# Patient Record
Sex: FEMALE | Race: WHITE | NOT HISPANIC OR LATINO | URBAN - METROPOLITAN AREA
[De-identification: names, ages, dates, MRNs, and addresses within clinical notes are randomized per-mention and may not be internally consistent; named-entity substitution may affect disease eponyms.]

---

## 2024-03-11 ENCOUNTER — TELEPHONE (OUTPATIENT)
Dept: OBSTETRICS AND GYNECOLOGY | Facility: CLINIC | Age: 29
End: 2024-03-11

## 2024-03-11 DIAGNOSIS — Z30.41 ENCOUNTER FOR SURVEILLANCE OF CONTRACEPTIVE PILLS: ICD-10-CM

## 2024-03-11 RX ORDER — NORETHINDRONE ACETATE AND ETHINYL ESTRADIOL 1MG-20(21)
1 KIT ORAL DAILY
Qty: 28 TABLET | Refills: 6 | Status: SHIPPED | OUTPATIENT
Start: 2024-03-11

## 2024-03-11 RX ORDER — NORETHINDRONE ACETATE AND ETHINYL ESTRADIOL 1MG-20(21)
1 KIT ORAL DAILY
COMMUNITY
End: 2024-03-11 | Stop reason: SDUPTHER

## 2024-03-11 NOTE — TELEPHONE ENCOUNTER
----- Message from Pita Pino sent at 3/11/2024 11:43 AM EDT -----  Regarding: Rx Refill Request  Patient calling for a refill request:  Junel FE    Rx will need TOMMIE, patient can only tolerate Junel Fe.     Local pharmacy:  [facility] [facility]                               [phone]    Note:  Patient not due for her Annual Exam with Kimberly until after 09-05-24.     Thank you

## 2024-03-26 ENCOUNTER — TELEPHONE (OUTPATIENT)
Dept: OBSTETRICS AND GYNECOLOGY | Facility: CLINIC | Age: 29
End: 2024-03-26

## 2024-10-01 ENCOUNTER — APPOINTMENT (OUTPATIENT)
Dept: OBSTETRICS AND GYNECOLOGY | Facility: CLINIC | Age: 29
End: 2024-10-01

## 2024-10-01 VITALS
SYSTOLIC BLOOD PRESSURE: 149 MMHG | DIASTOLIC BLOOD PRESSURE: 94 MMHG | HEIGHT: 65 IN | BODY MASS INDEX: 26.23 KG/M2 | WEIGHT: 157.4 LBS

## 2024-10-01 DIAGNOSIS — R03.0 ELEVATED BP WITHOUT DIAGNOSIS OF HYPERTENSION: ICD-10-CM

## 2024-10-01 DIAGNOSIS — Z30.41 ENCOUNTER FOR SURVEILLANCE OF CONTRACEPTIVE PILLS: ICD-10-CM

## 2024-10-01 DIAGNOSIS — Z01.419 WELL WOMAN EXAM WITH ROUTINE GYNECOLOGICAL EXAM: Primary | ICD-10-CM

## 2024-10-01 PROCEDURE — 3008F BODY MASS INDEX DOCD: CPT | Performed by: NURSE PRACTITIONER

## 2024-10-01 PROCEDURE — 99395 PREV VISIT EST AGE 18-39: CPT | Performed by: NURSE PRACTITIONER

## 2024-10-01 PROCEDURE — 1036F TOBACCO NON-USER: CPT | Performed by: NURSE PRACTITIONER

## 2024-10-01 RX ORDER — NORETHINDRONE ACETATE AND ETHINYL ESTRADIOL AND FERROUS FUMARATE 1MG-20(21)
1 KIT ORAL DAILY
Qty: 84 TABLET | Refills: 3 | Status: SHIPPED | OUTPATIENT
Start: 2024-10-01 | End: 2025-10-01

## 2024-10-01 ASSESSMENT — PAIN SCALES - GENERAL: PAINLEVEL: 0-NO PAIN

## 2024-10-01 NOTE — PROGRESS NOTES
HPI: Franchesca Funes is a 29 y.o. year old  presenting for annual gyn exam  Pt still on OCPs  Mother had breast ca, age 37. Did not have genetic testing, but will ask this year. Discussed with pt will start early screen in 30s, next year.  Single, working and traveling often. Will be going to [place] this month.    Current concerns: none    Cycles are 28 days apart, lasting for 8-9 days now with new generic medication, has been somewhat bothersome off of Junel, would like to go back.   LMP:  Patient's last menstrual period was 2024 (approximate).  Sexually active: no  STI concerns: no  Contraception: OCP   Last pap:  normal  History of abnormal pap: no  Other gyn history: none   OB History    No obstetric history on file.      No past medical history on file.   No past surgical history on file.   Social History    Socioeconomic History      Marital status: Single      Spouse name: Not on file      Number of children: Not on file      Years of education: Not on file      Highest education level: Not on file    Occupational History      Not on file    Tobacco Use      Smoking status: Never      Smokeless tobacco: Never    Substance and Sexual Activity      Alcohol use: Not on file      Drug use: Not on file      Sexual activity: Not on file    Other Topics      Concerns:        Not on file    Social History Narrative      Not on file    Social Determinants of Health  Financial Resource Strain: Not on file  Food Insecurity: Not on file  Transportation Needs: Not on file  Physical Activity: Not on file  Stress: Not on file  Social Connections: Not on file  Intimate Partner Violence: Not on file  Housing Stability: Not on file   No family history on file.     Current Outpatient Medications:   norethindrone-e.estradioL-iron (Junel FE 1/20) 1 mg-20 mcg (21)/75 mg (7) tablet, Take 1 tablet by mouth once daily., Disp: 28 tablet, Rfl: 6          REVIEW OF SYSTEMS:  Breast. denies masses, nipple discharge  : denies  "dysuria, hematuria incontinence   Gl: denies change in bowel habits, blood in stools      PHYSICAL EXAM:  Vitals: BP (!) 149/94 (BP Location: Left arm, Patient Position: Sitting, BP Cuff Size: Adult) Comment: Extended Vitals Done  Ht 1.651 m (5' 5\")   Wt 71.4 kg (157 lb 6.4 oz)   LMP 09/06/2024 (Approximate)   BMI 26.19 kg/m²   Gen: well appearing woman in NAD  HEENT: normocephalic, thyroid not enlarged, no lymphadenopathy  CV: no m/r/g  Chest: CTAB, no w/r/r  Breast: normal tissue bilaterally without masses, skin changes, lymphadenopathy   Abdomen: soft, nontender, no masses/hernias, liver and spleen not enlarged   Pelvic:  - external genitalia: normal  - vagina: normal  - cervix: normal  - uterus: normal size, nontender  - adnexa: no palpable masses or tenderness  RECTAL: no external hemorrhoids; rectal exam deferred    ASSESSMENT/PLAN :    1) Health maintenance, Well-woman exam.  Pap/HPV up to date  Nutrition, exercise and routine health maintenance exams reviewed.  Calcium/Vitamin D supplementation information provided   Smoking cessation: non-smoker  2) Contraception: OCPs, disliking the Aurovela generic, try TOMMIE Junel.   Discussed DVT and cardiovascular risk with OCP use. Patient is aware of this risk and desires to continue current oral contraceptive.   Pt instructed to monitor BP as it has been elevated this visit. Pt states sometimes in office it can be  Referral given for PCP  3) STD screening: declines, no concerns, not SA  4) Follow up one year or sooner as needed   "

## 2024-11-08 ENCOUNTER — APPOINTMENT (OUTPATIENT)
Dept: PRIMARY CARE | Facility: CLINIC | Age: 29
End: 2024-11-08
Payer: COMMERCIAL

## 2024-11-08 VITALS
BODY MASS INDEX: 26.29 KG/M2 | WEIGHT: 158 LBS | RESPIRATION RATE: 17 BRPM | SYSTOLIC BLOOD PRESSURE: 138 MMHG | TEMPERATURE: 98.1 F | HEART RATE: 83 BPM | OXYGEN SATURATION: 98 % | DIASTOLIC BLOOD PRESSURE: 93 MMHG

## 2024-11-08 DIAGNOSIS — Z83.3 FH: DIABETES MELLITUS: Primary | ICD-10-CM

## 2024-11-08 DIAGNOSIS — R03.0 ELEVATED BP WITHOUT DIAGNOSIS OF HYPERTENSION: ICD-10-CM

## 2024-11-08 PROCEDURE — 1036F TOBACCO NON-USER: CPT | Performed by: FAMILY MEDICINE

## 2024-11-08 PROCEDURE — 99202 OFFICE O/P NEW SF 15 MIN: CPT | Performed by: FAMILY MEDICINE

## 2024-11-08 ASSESSMENT — PATIENT HEALTH QUESTIONNAIRE - PHQ9
2. FEELING DOWN, DEPRESSED OR HOPELESS: NOT AT ALL
SUM OF ALL RESPONSES TO PHQ9 QUESTIONS 1 AND 2: 0
1. LITTLE INTEREST OR PLEASURE IN DOING THINGS: NOT AT ALL

## 2024-11-08 ASSESSMENT — ENCOUNTER SYMPTOMS: HYPERTENSION: 1

## 2024-11-08 NOTE — PROGRESS NOTES
Subjective   Patient ID: Franchesca Funes is a 29 y.o. female who presents for New Patient Visit and Hypertension.    Hypertension       Elevated BP   Gyn switched BCP to Junel ,with less propensity to cause BP elevation  Denies symptoms of HTN, previous h/o HTN.  Not drinking soda for an year  Dad has DM, mom has high cholesterol.    Cold symptoms for week, started on her way from [facility] vacation.  Symptoms slowly resolving        Review of Systems   All other systems reviewed and are negative.      Objective   BP (!) 138/93 (BP Location: Left arm, Patient Position: Sitting, BP Cuff Size: Adult)   Pulse 83   Temp 36.7 °C (98.1 °F) (Temporal)   Resp 17   Wt 71.7 kg (158 lb)   LMP 11/04/2024 (Exact Date)   SpO2 98%   BMI 26.29 kg/m²     Physical Exam  Vitals and nursing note reviewed.   Cardiovascular:      Rate and Rhythm: Normal rate and regular rhythm.   Pulmonary:      Effort: Pulmonary effort is normal.      Breath sounds: Normal breath sounds.   Musculoskeletal:      Cervical back: Neck supple.   Lymphadenopathy:      Cervical: No cervical adenopathy.   Neurological:      Mental Status: She is alert.   Psychiatric:         Mood and Affect: Mood normal.         Behavior: Behavior normal.         Thought Content: Thought content normal.         Judgment: Judgment normal.         Assessment/Plan   Diagnoses and all orders for this visit:  FH: diabetes mellitus  -     Hemoglobin A1c; Future  Elevated BP without diagnosis of hypertension  Comments:  DASH diet, home BP  Orders:  -     Referral to Primary Care  -     TSH; Future  -     Lipid panel; Future  -     Comprehensive Metabolic Panel; Future  -     CBC and Auto Differential; Future  Other orders  -     Follow Up In Primary Care - Established; Future

## 2024-12-05 ENCOUNTER — LAB (OUTPATIENT)
Dept: LAB | Facility: LAB | Age: 29
End: 2024-12-05
Payer: COMMERCIAL

## 2024-12-05 DIAGNOSIS — R03.0 ELEVATED BP WITHOUT DIAGNOSIS OF HYPERTENSION: ICD-10-CM

## 2024-12-05 DIAGNOSIS — Z83.3 FH: DIABETES MELLITUS: ICD-10-CM

## 2024-12-05 LAB
ALBUMIN SERPL BCP-MCNC: 4.4 G/DL (ref 3.4–5)
ALP SERPL-CCNC: 26 U/L (ref 33–110)
ALT SERPL W P-5'-P-CCNC: 10 U/L (ref 7–45)
ANION GAP SERPL CALC-SCNC: 11 MMOL/L (ref 10–20)
AST SERPL W P-5'-P-CCNC: 15 U/L (ref 9–39)
BASOPHILS # BLD AUTO: 0.05 X10*3/UL (ref 0–0.1)
BASOPHILS NFR BLD AUTO: 1 %
BILIRUB SERPL-MCNC: 0.8 MG/DL (ref 0–1.2)
BUN SERPL-MCNC: 9 MG/DL (ref 6–23)
CALCIUM SERPL-MCNC: 9.3 MG/DL (ref 8.6–10.3)
CHLORIDE SERPL-SCNC: 106 MMOL/L (ref 98–107)
CHOLEST SERPL-MCNC: 170 MG/DL (ref 0–199)
CHOLESTEROL/HDL RATIO: 2.6
CO2 SERPL-SCNC: 27 MMOL/L (ref 21–32)
CREAT SERPL-MCNC: 0.8 MG/DL (ref 0.5–1.05)
EGFRCR SERPLBLD CKD-EPI 2021: >90 ML/MIN/1.73M*2
EOSINOPHIL # BLD AUTO: 0.07 X10*3/UL (ref 0–0.7)
EOSINOPHIL NFR BLD AUTO: 1.4 %
ERYTHROCYTE [DISTWIDTH] IN BLOOD BY AUTOMATED COUNT: 12.1 % (ref 11.5–14.5)
EST. AVERAGE GLUCOSE BLD GHB EST-MCNC: 97 MG/DL
GLUCOSE SERPL-MCNC: 84 MG/DL (ref 74–99)
HBA1C MFR BLD: 5 %
HCT VFR BLD AUTO: 42.9 % (ref 36–46)
HDLC SERPL-MCNC: 64.3 MG/DL
HGB BLD-MCNC: 14.4 G/DL (ref 12–16)
IMM GRANULOCYTES # BLD AUTO: 0.01 X10*3/UL (ref 0–0.7)
IMM GRANULOCYTES NFR BLD AUTO: 0.2 % (ref 0–0.9)
LDLC SERPL CALC-MCNC: 93 MG/DL
LYMPHOCYTES # BLD AUTO: 1.54 X10*3/UL (ref 1.2–4.8)
LYMPHOCYTES NFR BLD AUTO: 31.5 %
MCH RBC QN AUTO: 31 PG (ref 26–34)
MCHC RBC AUTO-ENTMCNC: 33.6 G/DL (ref 32–36)
MCV RBC AUTO: 93 FL (ref 80–100)
MONOCYTES # BLD AUTO: 0.29 X10*3/UL (ref 0.1–1)
MONOCYTES NFR BLD AUTO: 5.9 %
NEUTROPHILS # BLD AUTO: 2.93 X10*3/UL (ref 1.2–7.7)
NEUTROPHILS NFR BLD AUTO: 60 %
NON HDL CHOLESTEROL: 106 MG/DL (ref 0–149)
NRBC BLD-RTO: 0 /100 WBCS (ref 0–0)
PLATELET # BLD AUTO: 270 X10*3/UL (ref 150–450)
POTASSIUM SERPL-SCNC: 4.6 MMOL/L (ref 3.5–5.3)
PROT SERPL-MCNC: 7.1 G/DL (ref 6.4–8.2)
RBC # BLD AUTO: 4.64 X10*6/UL (ref 4–5.2)
SODIUM SERPL-SCNC: 139 MMOL/L (ref 136–145)
TRIGL SERPL-MCNC: 64 MG/DL (ref 0–149)
TSH SERPL-ACNC: 2.52 MIU/L (ref 0.44–3.98)
VLDL: 13 MG/DL (ref 0–40)
WBC # BLD AUTO: 4.9 X10*3/UL (ref 4.4–11.3)

## 2024-12-05 PROCEDURE — 80053 COMPREHEN METABOLIC PANEL: CPT

## 2024-12-05 PROCEDURE — 83036 HEMOGLOBIN GLYCOSYLATED A1C: CPT

## 2024-12-05 PROCEDURE — 36415 COLL VENOUS BLD VENIPUNCTURE: CPT

## 2024-12-05 PROCEDURE — 84443 ASSAY THYROID STIM HORMONE: CPT

## 2024-12-05 PROCEDURE — 80061 LIPID PANEL: CPT

## 2024-12-05 PROCEDURE — 85025 COMPLETE CBC W/AUTO DIFF WBC: CPT

## 2025-02-06 ASSESSMENT — ENCOUNTER SYMPTOMS
PALPITATIONS: 0
PND: 0
SWEATS: 0
HEADACHES: 0
HYPERTENSION: 1
NECK PAIN: 0
BLURRED VISION: 0
ORTHOPNEA: 0
SHORTNESS OF BREATH: 0

## 2025-02-07 ENCOUNTER — APPOINTMENT (OUTPATIENT)
Dept: PRIMARY CARE | Facility: CLINIC | Age: 30
End: 2025-02-07
Payer: COMMERCIAL

## 2025-02-07 VITALS
BODY MASS INDEX: 26.92 KG/M2 | WEIGHT: 161.8 LBS | TEMPERATURE: 98.8 F | DIASTOLIC BLOOD PRESSURE: 84 MMHG | SYSTOLIC BLOOD PRESSURE: 131 MMHG

## 2025-02-07 DIAGNOSIS — R03.0 ELEVATED BP WITHOUT DIAGNOSIS OF HYPERTENSION: Primary | ICD-10-CM

## 2025-02-07 PROCEDURE — 99212 OFFICE O/P EST SF 10 MIN: CPT | Performed by: FAMILY MEDICINE

## 2025-02-07 PROCEDURE — 1036F TOBACCO NON-USER: CPT | Performed by: FAMILY MEDICINE

## 2025-02-07 NOTE — PROGRESS NOTES
Subjective   Franchesca Funes is here for follow-up of her hypertension. Her high blood pressure was first noted 3 months ago. Home blood pressure readings: did not bring log. Salt intake and diet: diet: general.. Associated signs and symptoms: none. Patient denies: chest pain. Use of agents associated with hypertension: none. Medication compliance: not currently on medications for this problem.    Objective   Physical Exam    Lab Review   No visits with results within 2 Month(s) from this visit.   Latest known visit with results is:   Lab on 12/05/2024   Component Date Value    Thyroid Stimulating Horm* 12/05/2024 2.52     Cholesterol 12/05/2024 170     HDL-Cholesterol 12/05/2024 64.3     Cholesterol/HDL Ratio 12/05/2024 2.6     LDL Calculated 12/05/2024 93     VLDL 12/05/2024 13     Triglycerides 12/05/2024 64     Non HDL Cholesterol 12/05/2024 106     Glucose 12/05/2024 84     Sodium 12/05/2024 139     Potassium 12/05/2024 4.6     Chloride 12/05/2024 106     Bicarbonate 12/05/2024 27     Anion Gap 12/05/2024 11     Urea Nitrogen 12/05/2024 9     Creatinine 12/05/2024 0.80     eGFR 12/05/2024 >90     Calcium 12/05/2024 9.3     Albumin 12/05/2024 4.4     Alkaline Phosphatase 12/05/2024 26 (L)     Total Protein 12/05/2024 7.1     AST 12/05/2024 15     Bilirubin, Total 12/05/2024 0.8     ALT 12/05/2024 10     WBC 12/05/2024 4.9     nRBC 12/05/2024 0.0     RBC 12/05/2024 4.64     Hemoglobin 12/05/2024 14.4     Hematocrit 12/05/2024 42.9     MCV 12/05/2024 93     MCH 12/05/2024 31.0     MCHC 12/05/2024 33.6     RDW 12/05/2024 12.1     Platelets 12/05/2024 270     Neutrophils % 12/05/2024 60.0     Immature Granulocytes %,* 12/05/2024 0.2     Lymphocytes % 12/05/2024 31.5     Monocytes % 12/05/2024 5.9     Eosinophils % 12/05/2024 1.4     Basophils % 12/05/2024 1.0     Neutrophils Absolute 12/05/2024 2.93     Immature Granulocytes Ab* 12/05/2024 0.01     Lymphocytes Absolute 12/05/2024 1.54     Monocytes Absolute  12/05/2024 0.29     Eosinophils Absolute 12/05/2024 0.07     Basophils Absolute 12/05/2024 0.05     Hemoglobin A1C 12/05/2024 5.0     Estimated Average Glucose 12/05/2024 97        Assessment/Plan   Hypertension, prehypertension . Evidence of target organ damage: .    Dietary sodium restriction.  Regular aerobic exercise.  Check blood pressures monthly and record.  Answers submitted by the patient for this visit:  High Blood Pressure Questionnaire (Submitted on 2/6/2025)  Chief Complaint: Hypertension  Chronicity: new  Onset: more than 1 month ago  Progression since onset: gradually improving  Condition status: controlled  anxiety: No  blurred vision: No  chest pain: No  headaches: No  malaise/fatigue: No  neck pain: No  orthopnea: No  palpitations: No  peripheral edema: No  PND: No  shortness of breath: No  sweats: No  Agents associated with hypertension: NSAIDs, oral contraceptives  CAD risks: no known risk factors, stress  Compliance problems: medication side effects

## 2025-07-11 ENCOUNTER — TELEPHONE (OUTPATIENT)
Dept: OBSTETRICS AND GYNECOLOGY | Facility: CLINIC | Age: 30
End: 2025-07-11
Payer: COMMERCIAL

## 2025-07-11 DIAGNOSIS — Z30.41 ENCOUNTER FOR SURVEILLANCE OF CONTRACEPTIVE PILLS: ICD-10-CM

## 2025-07-11 RX ORDER — NORETHINDRONE ACETATE AND ETHINYL ESTRADIOL AND FERROUS FUMARATE 1MG-20(21)
1 KIT ORAL DAILY
Qty: 84 TABLET | Refills: 0 | Status: SHIPPED | OUTPATIENT
Start: 2025-07-11 | End: 2026-07-11

## 2026-02-06 ENCOUNTER — APPOINTMENT (OUTPATIENT)
Dept: PRIMARY CARE | Facility: CLINIC | Age: 31
End: 2026-02-06
Payer: COMMERCIAL